# Patient Record
Sex: FEMALE | Race: BLACK OR AFRICAN AMERICAN | URBAN - METROPOLITAN AREA
[De-identification: names, ages, dates, MRNs, and addresses within clinical notes are randomized per-mention and may not be internally consistent; named-entity substitution may affect disease eponyms.]

---

## 2022-05-18 ENCOUNTER — OFFICE VISIT (OUTPATIENT)
Dept: URBAN - METROPOLITAN AREA CLINIC 44 | Facility: CLINIC | Age: 87
End: 2022-05-18
Payer: MEDICARE

## 2022-05-18 DIAGNOSIS — H40.1133 BILATERAL PRIMARY OPEN-ANGLE GLAUCOMA, SEVERE STAGE: Primary | ICD-10-CM

## 2022-05-18 PROCEDURE — 92133 CPTRZD OPH DX IMG PST SGM ON: CPT | Performed by: OPHTHALMOLOGY

## 2022-05-18 PROCEDURE — 76514 ECHO EXAM OF EYE THICKNESS: CPT | Performed by: OPHTHALMOLOGY

## 2022-05-18 PROCEDURE — 99204 OFFICE O/P NEW MOD 45 MIN: CPT | Performed by: OPHTHALMOLOGY

## 2022-05-18 PROCEDURE — 92083 EXTENDED VISUAL FIELD XM: CPT | Performed by: OPHTHALMOLOGY

## 2022-05-18 RX ORDER — LATANOPROST 50 UG/ML
0.005 % SOLUTION OPHTHALMIC
Qty: 7.5 | Refills: 3 | Status: ACTIVE
Start: 2022-05-18

## 2022-05-18 ASSESSMENT — INTRAOCULAR PRESSURE
OD: 24
OS: 22

## 2022-05-18 NOTE — IMPRESSION/PLAN
Impression: Bilateral primary open-angle glaucoma, severe stage: K79.1446. Plan: PT HAS SEVERE GLAUCOMA OU     GONIO :  SS OU (05/18/2022)       PACHS:  [phone] (05/18/2022) S/P CE IOL 2020 S/P SLT OD 2017 PT HAS BEEN TREATED BY DR Renetta Mariano 
PT DENIES FAMILY HX OF GLAUCOMA
PT DENIES SULFA ALLERGY 
PT DENIES LUNG DZ 
TARGET IOP MID TEENS OR LESS 
RECOMMEND : 
1. CONTINUE LATANOPROST OU QPM
2. IOP TODAY 5/18/2022 IS SUB OPTIMAL, RECOMMEND SLT LASER OD THEN OS TO TRY AND LOWER IOP 2-4 MM HG FOR 6-12 MONTHS 3. PT WISHES TO PROCEED WITH SLT LASER OU 4. PT UNDERSTANDS THAT LATANOPROST WILL NEED TO CONTINUED EVEN AFTER SLT LASER 5.  SCHEDULE SLT LASER OD THEN OS

## 2022-07-27 ENCOUNTER — OFFICE VISIT (OUTPATIENT)
Dept: URBAN - METROPOLITAN AREA CLINIC 44 | Facility: CLINIC | Age: 87
End: 2022-07-27
Payer: MEDICARE

## 2022-07-27 DIAGNOSIS — H40.1133 BILATERAL PRIMARY OPEN-ANGLE GLAUCOMA, SEVERE STAGE: Primary | ICD-10-CM

## 2022-07-27 PROCEDURE — 99213 OFFICE O/P EST LOW 20 MIN: CPT | Performed by: OPHTHALMOLOGY

## 2022-07-27 ASSESSMENT — INTRAOCULAR PRESSURE
OD: 15
OS: 16

## 2022-07-27 NOTE — IMPRESSION/PLAN
Impression: Bilateral primary open-angle glaucoma, severe stage: H97.2246. Plan: PT HAS SEVERE GLAUCOMA OU     GONIO :  SS OU (05/18/2022)       PACHS:  [phone] (05/18/2022) S/P SLT OD 2017 (ELSEWHERE) 
S/P SLT OD 06/3/22 // S/P SLT OS 06/17/22 PT HAS BEEN TREATED BY DR Maddy Swain 
PT DENIES FAMILY HX OF GLAUCOMA
PT DENIES SULFA ALLERGY 
PT DENIES LUNG DZ 
TARGET IOP MID TEENS OR LESS 
RECOMMEND : 
1. PT IS DOING WELL S/P SLT OU 2. CONTINUE LATANOPROST OU QPM
3. F/U IOP CHECK 6 MONTHS AND CONTINUE CARE WITH DR Maddy Swain

## 2023-01-27 ENCOUNTER — OFFICE VISIT (OUTPATIENT)
Dept: URBAN - METROPOLITAN AREA CLINIC 44 | Facility: CLINIC | Age: 88
End: 2023-01-27
Payer: MEDICARE

## 2023-01-27 DIAGNOSIS — H40.1133 BILATERAL PRIMARY OPEN-ANGLE GLAUCOMA, SEVERE STAGE: Primary | ICD-10-CM

## 2023-01-27 PROCEDURE — 99213 OFFICE O/P EST LOW 20 MIN: CPT | Performed by: OPHTHALMOLOGY

## 2023-01-27 RX ORDER — LATANOPROST 50 UG/ML
0.005 % SOLUTION OPHTHALMIC
Qty: 7.5 | Refills: 3 | Status: ACTIVE
Start: 2023-01-27

## 2023-01-27 ASSESSMENT — INTRAOCULAR PRESSURE
OD: 18
OS: 21

## 2023-01-27 NOTE — IMPRESSION/PLAN
Impression: Bilateral primary open-angle glaucoma, severe stage: J76.4640. Plan: PT HAS SEVERE GLAUCOMA OU     GONIO :  SS OU (05/18/2022)       PACHS:  [phone] (05/18/2022) S/P SLT OD 2017 (ELSEWHERE) 
S/P SLT OD 06/3/22 // S/P SLT OS 06/17/22 PT HAS BEEN TREATED BY DR Kinjal Guerra 
PT DENIES FAMILY HX OF GLAUCOMA
PT DENIES SULFA ALLERGY 
PT DENIES LUNG DZ 
TARGET IOP MID TEENS OR LESS 
RECOMMEND : 
1. PT IS DOING WELL S/P SLT OU 2. CONTINUE LATANOPROST OU QPM
3. F/U IOP CHECK 6 MONTHS WITH VF AND CONSIDER ADDITIONAL SLT AT THAT TIME 4.  Kranthi Guerra

## 2023-07-28 ENCOUNTER — OFFICE VISIT (OUTPATIENT)
Dept: URBAN - METROPOLITAN AREA CLINIC 44 | Facility: CLINIC | Age: 88
End: 2023-07-28
Payer: MEDICARE

## 2023-07-28 DIAGNOSIS — H40.1133 BILATERAL PRIMARY OPEN-ANGLE GLAUCOMA, SEVERE STAGE: Primary | ICD-10-CM

## 2023-07-28 PROCEDURE — 99214 OFFICE O/P EST MOD 30 MIN: CPT | Performed by: OPHTHALMOLOGY

## 2023-07-28 PROCEDURE — 92083 EXTENDED VISUAL FIELD XM: CPT | Performed by: OPHTHALMOLOGY

## 2023-07-28 ASSESSMENT — INTRAOCULAR PRESSURE
OS: 18
OD: 18

## 2023-10-18 ENCOUNTER — OFFICE VISIT (OUTPATIENT)
Dept: URBAN - METROPOLITAN AREA CLINIC 44 | Facility: CLINIC | Age: 88
End: 2023-10-18
Payer: MEDICARE

## 2023-10-18 DIAGNOSIS — H40.1133 BILATERAL PRIMARY OPEN-ANGLE GLAUCOMA, SEVERE STAGE: Primary | ICD-10-CM

## 2023-10-18 PROCEDURE — 99213 OFFICE O/P EST LOW 20 MIN: CPT | Performed by: OPHTHALMOLOGY

## 2023-10-18 ASSESSMENT — INTRAOCULAR PRESSURE
OS: 12
OD: 11

## 2024-04-26 ENCOUNTER — OFFICE VISIT (OUTPATIENT)
Dept: URBAN - METROPOLITAN AREA CLINIC 44 | Facility: CLINIC | Age: 89
End: 2024-04-26
Payer: MEDICARE

## 2024-04-26 DIAGNOSIS — H40.1133 BILATERAL PRIMARY OPEN-ANGLE GLAUCOMA, SEVERE STAGE: Primary | ICD-10-CM

## 2024-04-26 PROCEDURE — 99213 OFFICE O/P EST LOW 20 MIN: CPT | Performed by: OPHTHALMOLOGY

## 2024-04-26 ASSESSMENT — INTRAOCULAR PRESSURE
OD: 15
OS: 15

## 2024-11-13 ENCOUNTER — OFFICE VISIT (OUTPATIENT)
Dept: URBAN - METROPOLITAN AREA CLINIC 44 | Facility: CLINIC | Age: 89
End: 2024-11-13
Payer: MEDICARE

## 2024-11-13 DIAGNOSIS — H40.1133 BILATERAL PRIMARY OPEN-ANGLE GLAUCOMA, SEVERE STAGE: Primary | ICD-10-CM

## 2024-11-13 PROCEDURE — 99213 OFFICE O/P EST LOW 20 MIN: CPT | Performed by: OPHTHALMOLOGY

## 2024-11-13 PROCEDURE — 92083 EXTENDED VISUAL FIELD XM: CPT | Performed by: OPHTHALMOLOGY

## 2024-11-13 RX ORDER — LATANOPROST 50 UG/ML
0.005 % SOLUTION OPHTHALMIC
Qty: 7.5 | Refills: 3 | Status: ACTIVE
Start: 2024-11-13

## 2024-11-13 ASSESSMENT — INTRAOCULAR PRESSURE
OD: 14
OS: 16

## 2025-05-14 ENCOUNTER — OFFICE VISIT (OUTPATIENT)
Dept: URBAN - METROPOLITAN AREA CLINIC 44 | Facility: CLINIC | Age: OVER 89
End: 2025-05-14
Payer: MEDICARE

## 2025-05-14 DIAGNOSIS — H40.1133 BILATERAL PRIMARY OPEN-ANGLE GLAUCOMA, SEVERE STAGE: Primary | ICD-10-CM

## 2025-05-14 PROCEDURE — 99213 OFFICE O/P EST LOW 20 MIN: CPT | Performed by: OPHTHALMOLOGY

## 2025-05-14 PROCEDURE — 92133 CPTRZD OPH DX IMG PST SGM ON: CPT | Performed by: OPHTHALMOLOGY

## 2025-05-14 ASSESSMENT — INTRAOCULAR PRESSURE
OD: 18
OS: 18